# Patient Record
Sex: FEMALE | Race: WHITE | NOT HISPANIC OR LATINO | Employment: OTHER | URBAN - METROPOLITAN AREA
[De-identification: names, ages, dates, MRNs, and addresses within clinical notes are randomized per-mention and may not be internally consistent; named-entity substitution may affect disease eponyms.]

---

## 2018-11-05 DIAGNOSIS — N39.0 RECURRENT UTI: Primary | ICD-10-CM

## 2018-11-06 ENCOUNTER — HOSPITAL ENCOUNTER (OUTPATIENT)
Dept: RADIOLOGY | Facility: HOSPITAL | Age: 83
Discharge: HOME | End: 2018-11-06
Attending: FAMILY MEDICINE
Payer: MEDICARE

## 2018-11-06 DIAGNOSIS — N39.0 RECURRENT UTI: ICD-10-CM

## 2018-11-06 PROCEDURE — 76770 US EXAM ABDO BACK WALL COMP: CPT | Mod: TC

## 2018-11-06 PROCEDURE — 76770 US EXAM ABDO BACK WALL COMP: CPT | Mod: 26,,, | Performed by: RADIOLOGY

## 2018-11-07 ENCOUNTER — APPOINTMENT (OUTPATIENT)
Dept: LAB | Facility: HOSPITAL | Age: 83
End: 2018-11-07
Attending: FAMILY MEDICINE
Payer: MEDICARE

## 2018-11-07 DIAGNOSIS — N39.0 URINARY TRACT INFECTION, SITE NOT SPECIFIED: Primary | ICD-10-CM

## 2018-11-07 LAB
BACTERIA #/AREA URNS HPF: ABNORMAL /HPF
BILIRUB UR QL STRIP: NEGATIVE
CLARITY UR: CLEAR
COLOR UR: YELLOW
GLUCOSE UR QL STRIP: NEGATIVE
HGB UR QL STRIP: ABNORMAL
KETONES UR QL STRIP: ABNORMAL
LEUKOCYTE ESTERASE UR QL STRIP: ABNORMAL
MICROSCOPIC COMMENT: ABNORMAL
NITRITE UR QL STRIP: NEGATIVE
PH UR STRIP: 6 [PH] (ref 5–8)
PROT UR QL STRIP: NEGATIVE
RBC #/AREA URNS HPF: 6 /HPF (ref 0–4)
SP GR UR STRIP: 1.01 (ref 1–1.03)
URN SPEC COLLECT METH UR: ABNORMAL
UROBILINOGEN UR STRIP-ACNC: NEGATIVE EU/DL
WBC #/AREA URNS HPF: 30 /HPF (ref 0–5)

## 2018-11-07 PROCEDURE — 87088 URINE BACTERIA CULTURE: CPT

## 2018-11-07 PROCEDURE — 87077 CULTURE AEROBIC IDENTIFY: CPT

## 2018-11-07 PROCEDURE — 81000 URINALYSIS NONAUTO W/SCOPE: CPT

## 2018-11-07 PROCEDURE — 87186 SC STD MICRODIL/AGAR DIL: CPT

## 2018-11-07 PROCEDURE — 87086 URINE CULTURE/COLONY COUNT: CPT

## 2018-11-10 LAB — BACTERIA UR CULT: NORMAL

## 2018-11-13 ENCOUNTER — TELEPHONE (OUTPATIENT)
Dept: UROLOGY | Facility: CLINIC | Age: 83
End: 2018-11-13

## 2018-11-13 NOTE — TELEPHONE ENCOUNTER
----- Message from Dany Seth sent at 11/13/2018  8:57 AM CST -----  Contact: Candida/Dr. Lau' Office  Candida called in and wanted to see if office received a referral from them that was faxed on the attached patient on 11/8/18?  Candida's call back number is [phone]

## 2018-11-21 ENCOUNTER — OFFICE VISIT (OUTPATIENT)
Dept: UROLOGY | Facility: CLINIC | Age: 83
End: 2018-11-21
Payer: MEDICARE

## 2018-11-21 VITALS
DIASTOLIC BLOOD PRESSURE: 84 MMHG | HEIGHT: 63 IN | WEIGHT: 94 LBS | HEART RATE: 104 BPM | TEMPERATURE: 98 F | SYSTOLIC BLOOD PRESSURE: 120 MMHG | BODY MASS INDEX: 16.66 KG/M2

## 2018-11-21 DIAGNOSIS — N30.00 ACUTE CYSTITIS WITHOUT HEMATURIA: ICD-10-CM

## 2018-11-21 LAB
BILIRUB SERPL-MCNC: NEGATIVE MG/DL
BLOOD URINE, POC: ABNORMAL
COLOR, POC UA: ABNORMAL
GLUCOSE UR QL STRIP: NEGATIVE
KETONES UR QL STRIP: NEGATIVE
LEUKOCYTE ESTERASE URINE, POC: NEGATIVE
NITRITE, POC UA: POSITIVE
PH, POC UA: 6
PROTEIN, POC: NEGATIVE
SPECIFIC GRAVITY, POC UA: 1005
UROBILINOGEN, POC UA: 1

## 2018-11-21 PROCEDURE — 87077 CULTURE AEROBIC IDENTIFY: CPT

## 2018-11-21 PROCEDURE — 87086 URINE CULTURE/COLONY COUNT: CPT

## 2018-11-21 PROCEDURE — 87088 URINE BACTERIA CULTURE: CPT

## 2018-11-21 PROCEDURE — 81002 URINALYSIS NONAUTO W/O SCOPE: CPT | Mod: PBBFAC | Performed by: UROLOGY

## 2018-11-21 PROCEDURE — 87186 SC STD MICRODIL/AGAR DIL: CPT | Mod: 59

## 2018-11-21 PROCEDURE — 99203 OFFICE O/P NEW LOW 30 MIN: CPT | Mod: S$PBB,,, | Performed by: UROLOGY

## 2018-11-21 PROCEDURE — 99999 PR PBB SHADOW E&M-EST. PATIENT-LVL IV: CPT | Mod: PBBFAC,,, | Performed by: UROLOGY

## 2018-11-21 PROCEDURE — 99214 OFFICE O/P EST MOD 30 MIN: CPT | Mod: PBBFAC | Performed by: UROLOGY

## 2018-11-21 RX ORDER — BUSPIRONE HYDROCHLORIDE 5 MG/1
10 TABLET ORAL 3 TIMES DAILY
COMMUNITY
Start: 2018-11-16 | End: 2022-07-01 | Stop reason: SDUPTHER

## 2018-11-21 RX ORDER — TRAZODONE HYDROCHLORIDE 100 MG/1
100 TABLET ORAL NIGHTLY
COMMUNITY
Start: 2018-10-31

## 2018-11-21 RX ORDER — ALBUTEROL SULFATE 90 UG/1
AEROSOL, METERED RESPIRATORY (INHALATION)
COMMUNITY
Start: 2018-11-01 | End: 2022-07-01

## 2018-11-21 RX ORDER — ENTACAPONE 200 MG/1
TABLET ORAL
COMMUNITY
Start: 2018-10-18 | End: 2019-08-09

## 2018-11-21 RX ORDER — CARBIDOPA AND LEVODOPA 25; 250 MG/1; MG/1
1 TABLET ORAL 3 TIMES DAILY
COMMUNITY
Start: 2018-11-16 | End: 2019-09-16 | Stop reason: SDUPTHER

## 2018-11-21 RX ORDER — PROPRANOLOL HYDROCHLORIDE 20 MG/1
TABLET ORAL
COMMUNITY
End: 2019-12-18 | Stop reason: ALTCHOICE

## 2018-11-21 RX ORDER — OMEPRAZOLE 40 MG/1
40 CAPSULE, DELAYED RELEASE ORAL DAILY
COMMUNITY
Start: 2018-10-29 | End: 2022-07-01

## 2018-11-21 RX ORDER — NITROFURANTOIN 25; 75 MG/1; MG/1
100 CAPSULE ORAL 2 TIMES DAILY
Qty: 40 CAPSULE | Refills: 0 | Status: SHIPPED | OUTPATIENT
Start: 2018-11-21 | End: 2018-12-11

## 2018-11-21 NOTE — LETTER
November 21, 2018      Alon Lau MD  [address]  [facility] [place] [address]           [facility]  [address] [place] [address]  Phone: [phone]  Fax: [phone]          Patient: Chiara Nunez   MR Number: [id number]   YOB: 1934   Date of Visit: 11/21/2018       Dear Dr. Alon Lau:    Thank you for referring Chiara Nunez to me for evaluation. Attached you will find relevant portions of my assessment and plan of care.    If you have questions, please do not hesitate to call me. I look forward to following Chiara uNnez along with you.    Sincerely,    Ganesh Mason MD    Enclosure  CC:  No Recipients    If you would like to receive this communication electronically, please contact [email or web address] or [phone] to request more information on [employer] Link access.    For providers and/or their staff who would like to refer a patient to [facility], please contact us through our one-stop-shop provider referral line, [facility], at [phone].    If you feel you have received this communication in error or would no longer like to receive these types of communications, please e-mail [email or web address]

## 2018-11-21 NOTE — PROGRESS NOTES
Subjective:       Patient ID: Chiara Nunez is a 84 y.o. female.    Chief Complaint:   DATE OF VISIT:  11/21/2018.    CHIEF COMPLAINT:  Recurrent urinary tract infections.    Ms. Nunez is an 84-year-old female who referred she is having recurrent  urinary tract infections.  Recent ultrasound shows very minimal mild  right-sided hydronephrosis.  It also shows mild renal atrophy bilaterally.   No evidence of any significant obstruction.  The patient referred that a  urine culture was performed by his family doctor and shows E. coli.  There  were multiple resistances.  In any extent, this E. coli is sensitive to  nitrofurantoin.  The patient is being treated for 5 to 7 days with  nitrofurantoin and the infection is recurring.    On further questioning the patient, the patient's daughter revealed that  the patient does not wipe her rectum properly at the time of her bowel  movement and contaminates the urethra continuously.  I had a lengthy  discussion with her and the daughter regarding the proper way for hygiene  and to prevent urinary tract infections at her age.  They are going to  change.  They are also going to increase the fluid intake and they are  going to increase the Citrus juices intake in order to acidify the urine  and hopefully will be better controlling the urinary tract infections.   After a lengthy discussion we agreed that apart from those measures, she is  going to take Macrobid 100 mg twice a day for two weeks and then daily and  she is going to follow up with me in a month.  If in a month, the patient  is doing well, we are going to decide what type of suppression she is going  to be placed on.  All the questions were answered at her and her daughter's  satisfaction and they left the office in satisfactory condition.        EOR/HN dd: 11/21/2018 16:17:07 (CST)   td: 11/22/2018 00:34:23 (CST)  Doc ID #[id number]   Job ID #[id number]    CC:            Urinary Tract Infection    Pertinent negatives include  no flank pain, frequency, hematuria, urgency or constipation.     Review of Systems   Constitutional: Negative.  Negative for activity change.   HENT: Negative.  Negative for facial swelling.    Eyes: Negative for discharge.   Respiratory: Positive for shortness of breath. Negative for cough.    Cardiovascular: Negative for chest pain and palpitations.   Gastrointestinal: Negative for abdominal distention, blood in stool and constipation.   Genitourinary: Positive for dysuria. Negative for flank pain, frequency, hematuria, urgency and vaginal pain.   Musculoskeletal: Negative.  Negative for arthralgias.   Skin: Negative.    Neurological: Negative.  Negative for dizziness.   Hematological: Negative for adenopathy.   Psychiatric/Behavioral: The patient is not nervous/anxious.        Objective:          Assessment:       1. Acute cystitis without hematuria        Plan:       Acute cystitis without hematuria  -     POCT URINE DIPSTICK WITHOUT MICROSCOPE  -     Urine culture    Other orders  -     nitrofurantoin, macrocrystal-monohydrate, (MACROBID) 100 MG capsule; Take 1 capsule (100 mg total) by mouth 2 (two) times daily. for 40 doses  Dispense: 40 capsule; Refill: 0    RTC 1 mo.

## 2018-11-23 ENCOUNTER — TELEPHONE (OUTPATIENT)
Dept: UROLOGY | Facility: CLINIC | Age: 83
End: 2018-11-23

## 2018-11-23 NOTE — TELEPHONE ENCOUNTER
Contacted [facility] and canceled rx. Phoned in rx at [facility] in [place].    Attempted to call patient's daughter, Fabiola. No answer and unable to leave voicemail.        ----- Message from Natalie Linares sent at 11/23/2018  4:08 PM CST -----  Contact: Pt daughter Fabiola can be reached at [phone]  Fabiola is calling to speak with the nurse concerning a script of antiobotic nitrofurantoin, macrocrystal-monohydrate, (MACROBID) 100 MG capsule  Pharmacy is out of it.      Please send meds to [facility] in [place] [phone]       Thank you!

## 2018-11-24 LAB
BACTERIA UR CULT: NORMAL
BACTERIA UR CULT: NORMAL

## 2019-01-02 ENCOUNTER — OFFICE VISIT (OUTPATIENT)
Dept: UROLOGY | Facility: CLINIC | Age: 84
End: 2019-01-02
Payer: MEDICARE

## 2019-01-02 VITALS
WEIGHT: 96 LBS | HEIGHT: 65 IN | DIASTOLIC BLOOD PRESSURE: 81 MMHG | SYSTOLIC BLOOD PRESSURE: 136 MMHG | TEMPERATURE: 98 F | BODY MASS INDEX: 15.99 KG/M2 | HEART RATE: 84 BPM

## 2019-01-02 DIAGNOSIS — N30.00 ACUTE CYSTITIS WITHOUT HEMATURIA: Primary | ICD-10-CM

## 2019-01-02 LAB
BILIRUB SERPL-MCNC: NEGATIVE MG/DL
BLOOD URINE, POC: NEGATIVE
COLOR, POC UA: NORMAL
GLUCOSE UR QL STRIP: NEGATIVE
KETONES UR QL STRIP: NEGATIVE
LEUKOCYTE ESTERASE URINE, POC: NEGATIVE
NITRITE, POC UA: NEGATIVE
PH, POC UA: 5
PROTEIN, POC: NEGATIVE
SPECIFIC GRAVITY, POC UA: 1015
UROBILINOGEN, POC UA: NEGATIVE

## 2019-01-02 PROCEDURE — 99213 PR OFFICE/OUTPT VISIT, EST, LEVL III, 20-29 MIN: ICD-10-PCS | Mod: S$PBB,,, | Performed by: UROLOGY

## 2019-01-02 PROCEDURE — 99999 PR PBB SHADOW E&M-EST. PATIENT-LVL III: ICD-10-PCS | Mod: PBBFAC,,, | Performed by: UROLOGY

## 2019-01-02 PROCEDURE — 99213 OFFICE O/P EST LOW 20 MIN: CPT | Mod: PBBFAC | Performed by: UROLOGY

## 2019-01-02 PROCEDURE — 99999 PR PBB SHADOW E&M-EST. PATIENT-LVL III: CPT | Mod: PBBFAC,,, | Performed by: UROLOGY

## 2019-01-02 PROCEDURE — 99213 OFFICE O/P EST LOW 20 MIN: CPT | Mod: S$PBB,,, | Performed by: UROLOGY

## 2019-01-02 PROCEDURE — 81002 URINALYSIS NONAUTO W/O SCOPE: CPT | Mod: PBBFAC | Performed by: UROLOGY

## 2019-01-02 RX ORDER — NITROFURANTOIN 25; 75 MG/1; MG/1
100 CAPSULE ORAL 2 TIMES DAILY
Qty: 30 CAPSULE | Refills: 1 | Status: SHIPPED | OUTPATIENT
Start: 2019-01-02 | End: 2019-01-12

## 2019-01-02 NOTE — PROGRESS NOTES
OFFICE NOTE    DATE OF VISIT:  01/02/2019.    CHIEF COMPLAINT:  Chronic urinary tract infection.    HISTORY OF PRESENT ILLNESS:  Ms. Nunez is an 84-year-old female who was  last seen in our office on 11/21/2018.  The patient did have recurrent  urinary tract infections.  The patient was treated at that time with  Macrobid 100 mg p.o. twice a day.  She took that for 20 days and she  referred that she is feeling much better and the urine today is completely  clear.  An ultrasound of the kidney was performed that shows mild renal  atrophy with mild right-sided hydronephrosis and no site of obstruction.    I explained to the patient that the urine is completely clear and this is  encouraging and also encouraged her to keep the hygiene at the time of  bowel movement, well done and only wipe from the front to the back.   Increase fluid intake too.  I also suggested since she has this recurrent  infections that we may want to proceed with a course of suppressive therapy  using Macrobid 100 mg p.o. daily for the next two months and she agreed for  it.  All the questions were answered at her satisfaction and her daughter's  satisfaction and they left the office in satisfactory condition.  I spent  approximately 20 minutes with Ms. Nunez and her daughter and all the time  was spent on counseling.        EOR/IN dd: 01/02/2019 16:55:52 (CST)   td: 01/02/2019 17:19:52 (CST)  Doc ID #[id number]   Job ID #[id number]    CC:

## 2019-01-29 ENCOUNTER — CLINICAL SUPPORT (OUTPATIENT)
Dept: UROLOGY | Facility: CLINIC | Age: 84
End: 2019-01-29
Payer: MEDICARE

## 2019-01-29 ENCOUNTER — TELEPHONE (OUTPATIENT)
Dept: UROLOGY | Facility: CLINIC | Age: 84
End: 2019-01-29

## 2019-01-29 DIAGNOSIS — N30.00 ACUTE CYSTITIS WITHOUT HEMATURIA: Primary | ICD-10-CM

## 2019-01-29 LAB
BILIRUB SERPL-MCNC: NORMAL MG/DL
BLOOD URINE, POC: NORMAL
COLOR, POC UA: NORMAL
GLUCOSE UR QL STRIP: NORMAL
KETONES UR QL STRIP: NORMAL
LEUKOCYTE ESTERASE URINE, POC: NORMAL
NITRITE, POC UA: NORMAL
PH, POC UA: 5
PROTEIN, POC: NORMAL
SPECIFIC GRAVITY, POC UA: 1.01
UROBILINOGEN, POC UA: NORMAL

## 2019-01-29 PROCEDURE — 99999 PR PBB SHADOW E&M-EST. PATIENT-LVL I: ICD-10-PCS | Mod: PBBFAC,,,

## 2019-01-29 PROCEDURE — 99999 PR PBB SHADOW E&M-EST. PATIENT-LVL I: CPT | Mod: PBBFAC,,,

## 2019-01-29 PROCEDURE — 87086 URINE CULTURE/COLONY COUNT: CPT

## 2019-01-29 PROCEDURE — 81002 URINALYSIS NONAUTO W/O SCOPE: CPT | Mod: PBBFAC

## 2019-01-29 PROCEDURE — 99211 OFF/OP EST MAY X REQ PHY/QHP: CPT | Mod: PBBFAC

## 2019-01-29 NOTE — TELEPHONE ENCOUNTER
pts daughter states she has dysuria and a positive leukocyte at home test. Schedule pt nurse appt for urine sample drop off.

## 2019-01-29 NOTE — TELEPHONE ENCOUNTER
----- Message from Natalie Slater sent at 1/29/2019  9:18 AM CST -----  Contact: Fabiola  Type:  Sooner Apoointment Request    Caller is requesting a sooner appointment.  Caller declined first available appointment listed below.  Caller will not accept being placed on the waitlist and is requesting a message be sent to doctor.    Name of Caller:  daughter  When is the first available appointment?  2/20/19  Symptoms:  UTI, positive leukocytes  Best Call Back Number:  [phone] or [phone]  Additional Information:   States has an appointment tomorrow afternoon and asking if can be seen around 11:30 tomorrow. Thanks!

## 2019-01-29 NOTE — TELEPHONE ENCOUNTER
----- Message from Nydia Sanchez sent at 1/29/2019 11:46 AM CST -----  Contact: Fabiola Day (Daughter)  Type: Needs Medical Advice    Who Called:  Fabiola Day (Daughter)  Symptoms (please be specific):  UTI  Best Call Back Number: Fabiola at  or   Additional Information: Calling to speak with the Nurse about the symptoms. They took a test last night and it turned deep purple. She would like to bring the patient in today about 3:30, if possible or tomorrow 11. Please advise.

## 2019-01-29 NOTE — TELEPHONE ENCOUNTER
----- Message from Mary Main sent at 1/29/2019 10:03 AM CST -----  Contact: Daughter Fabiola [phone]  Call placed to pod. Patient's daughter Fabiola called back she just missed a call from the office she was trying to get her mother seen today. Call back at [phone] thank you

## 2019-01-30 ENCOUNTER — TELEPHONE (OUTPATIENT)
Dept: UROLOGY | Facility: CLINIC | Age: 84
End: 2019-01-30

## 2019-01-30 LAB
BACTERIA UR CULT: NORMAL
BACTERIA UR CULT: NORMAL

## 2019-01-30 NOTE — TELEPHONE ENCOUNTER
----- Message from Bhavna Guerra sent at 1/30/2019  1:10 PM CST -----  Type: Needs Medical Advice    Who Called:  Alison Gilesmckayla - daughter  Pharmacy name and phone #:   [id number] - [place], [place] - [address]  [address]  [place] [address]  Phone: [phone] Fax: [phone]  Best Call Back Number: [phone]  Additional Information: contact caller regarding patient's test results from the urine culture yesterday 01/29/19 and if any medication will be phoned into her pharmacy     Thank you

## 2019-01-30 NOTE — TELEPHONE ENCOUNTER
Informed pts daughter that provider wants to wait on the culture results from the lab before he prescribes anything. Pt verbalized understanding.

## 2019-04-17 ENCOUNTER — TELEPHONE (OUTPATIENT)
Dept: UROLOGY | Facility: CLINIC | Age: 84
End: 2019-04-17

## 2019-04-17 DIAGNOSIS — N30.00 ACUTE CYSTITIS WITHOUT HEMATURIA: Primary | ICD-10-CM

## 2019-04-17 NOTE — TELEPHONE ENCOUNTER
----- Message from Sameera Walker sent at 4/17/2019  1:43 PM CDT -----  Contact: daughterFabiola  Type: Needs Medical Advice    Who Called:  DaughterFabiola  Symptoms (please be specific):  UTI  How long has patient had these symptoms:  Week  [facility]  Best Call Back Number: [phone]  Additional Information: patient is acting erratic and showing signs of a UTI. Daughter would like to bring in a urine sample for testing. Please call daughter. Thanks!

## 2019-04-17 NOTE — TELEPHONE ENCOUNTER
Informed pts daughter that I will put in an order for her to bring a sample of the pts urine to the lab. pts daughter verbalized understanding.

## 2019-04-18 ENCOUNTER — LAB VISIT (OUTPATIENT)
Dept: LAB | Facility: HOSPITAL | Age: 84
End: 2019-04-18
Attending: UROLOGY
Payer: MEDICARE

## 2019-04-18 DIAGNOSIS — N30.00 ACUTE CYSTITIS WITHOUT HEMATURIA: ICD-10-CM

## 2019-04-18 LAB
BILIRUB UR QL STRIP: NEGATIVE
CLARITY UR: CLEAR
COLOR UR: YELLOW
GLUCOSE UR QL STRIP: NEGATIVE
HGB UR QL STRIP: NEGATIVE
KETONES UR QL STRIP: ABNORMAL
LEUKOCYTE ESTERASE UR QL STRIP: ABNORMAL
MICROSCOPIC COMMENT: NORMAL
NITRITE UR QL STRIP: NEGATIVE
PH UR STRIP: 6 [PH] (ref 5–8)
PROT UR QL STRIP: NEGATIVE
SP GR UR STRIP: 1.01 (ref 1–1.03)
URN SPEC COLLECT METH UR: ABNORMAL
UROBILINOGEN UR STRIP-ACNC: NEGATIVE EU/DL
WBC #/AREA URNS HPF: 3 /HPF (ref 0–5)

## 2019-04-18 PROCEDURE — 81000 URINALYSIS NONAUTO W/SCOPE: CPT

## 2019-04-18 PROCEDURE — 87086 URINE CULTURE/COLONY COUNT: CPT

## 2019-04-20 LAB
BACTERIA UR CULT: NORMAL
BACTERIA UR CULT: NORMAL

## 2019-04-22 ENCOUNTER — TELEPHONE (OUTPATIENT)
Dept: UROLOGY | Facility: CLINIC | Age: 84
End: 2019-04-22

## 2019-04-22 NOTE — TELEPHONE ENCOUNTER
Informed pts daughter that the results did not show any infection. Pt daughter verbalized understanding and states her symptoms are improving.

## 2019-04-22 NOTE — TELEPHONE ENCOUNTER
----- Message from Mohini Santillan sent at 4/22/2019  3:25 PM CDT -----    Pt  Is calling  For  Test  Results // please call  [phone]   Daughter  nhi

## 2019-07-26 ENCOUNTER — TELEPHONE (OUTPATIENT)
Dept: UROLOGY | Facility: CLINIC | Age: 84
End: 2019-07-26

## 2019-07-26 NOTE — TELEPHONE ENCOUNTER
Attempted to call daughters, Fabiola Day and Chiara Bert regarding message. No answer, no VM for both.    Per Dr. Mason, patient will need to go to ER for further evaluation.      ----- Message from Natalie Denson sent at 7/26/2019 11:34 AM CDT -----  Type: Needs Medical Advice    Who Called:  Daughter - Fabiola Nunez  Symptoms (please be specific):  Possible uti  How long has patient had these symptoms:  Acting confused  Pharmacy name and phone #:    [facility] in [place] in the new pharmacy - daughter does not have phone number  Best Call Back Number: [phone]  Additional Information: would like to  a specimen container and get a urine specimen to check for a urinary tract infection/daughter states this is how was checked before/please advise

## 2019-08-05 ENCOUNTER — TELEPHONE (OUTPATIENT)
Dept: NEUROLOGY | Facility: CLINIC | Age: 84
End: 2019-08-05

## 2019-08-05 NOTE — TELEPHONE ENCOUNTER
Called and spoke to Mrs. Nunez daughter regarding her mother's appt with . I stated that if she can bring her for 8:20 AM

## 2019-08-09 ENCOUNTER — OFFICE VISIT (OUTPATIENT)
Dept: NEUROLOGY | Facility: CLINIC | Age: 84
End: 2019-08-09
Payer: MEDICARE

## 2019-08-09 VITALS
SYSTOLIC BLOOD PRESSURE: 114 MMHG | HEIGHT: 65 IN | DIASTOLIC BLOOD PRESSURE: 70 MMHG | BODY MASS INDEX: 15.98 KG/M2 | HEART RATE: 83 BPM

## 2019-08-09 DIAGNOSIS — E53.8 B12 DEFICIENCY: Primary | ICD-10-CM

## 2019-08-09 DIAGNOSIS — G20.A1 PD (PARKINSON'S DISEASE): ICD-10-CM

## 2019-08-09 DIAGNOSIS — R41.9 COGNITIVE COMPLAINTS: ICD-10-CM

## 2019-08-09 PROBLEM — N30.00 ACUTE CYSTITIS: Status: RESOLVED | Noted: 2018-11-21 | Resolved: 2019-08-09

## 2019-08-09 PROCEDURE — 99204 PR OFFICE/OUTPT VISIT, NEW, LEVL IV, 45-59 MIN: ICD-10-PCS | Mod: S$PBB,,, | Performed by: PSYCHIATRY & NEUROLOGY

## 2019-08-09 PROCEDURE — 99213 OFFICE O/P EST LOW 20 MIN: CPT | Mod: PBBFAC | Performed by: PSYCHIATRY & NEUROLOGY

## 2019-08-09 PROCEDURE — 99204 OFFICE O/P NEW MOD 45 MIN: CPT | Mod: S$PBB,,, | Performed by: PSYCHIATRY & NEUROLOGY

## 2019-08-09 PROCEDURE — 99999 PR PBB SHADOW E&M-EST. PATIENT-LVL III: ICD-10-PCS | Mod: PBBFAC,,, | Performed by: PSYCHIATRY & NEUROLOGY

## 2019-08-09 PROCEDURE — 99999 PR PBB SHADOW E&M-EST. PATIENT-LVL III: CPT | Mod: PBBFAC,,, | Performed by: PSYCHIATRY & NEUROLOGY

## 2019-08-09 RX ORDER — CARBIDOPA AND LEVODOPA 50; 200 MG/1; MG/1
1 TABLET, EXTENDED RELEASE ORAL 3 TIMES DAILY
Qty: 90 TABLET | Refills: 3 | Status: SHIPPED | OUTPATIENT
Start: 2019-08-09 | End: 2019-09-13 | Stop reason: SDUPTHER

## 2019-08-09 RX ORDER — CYANOCOBALAMIN 1000 UG/ML
INJECTION, SOLUTION INTRAMUSCULAR; SUBCUTANEOUS
Qty: 10 ML | Refills: 3 | Status: SHIPPED | OUTPATIENT
Start: 2019-08-09 | End: 2022-07-01

## 2019-08-09 NOTE — ASSESSMENT & PLAN NOTE
Not fully evaluated today, but both indicate some subjective loss.   -> labs today    -> need [place] Cognitive Assessment next visit

## 2019-08-09 NOTE — LETTER
August 9, 2019      Alon Lau MD  [address]  [facility] [place] [address]           [facility]  [address]  [place] [address]  Phone: [phone]  Fax: [phone]          Patient: Chiara Nunez   MR Number: [id number]   YOB: 1934   Date of Visit: 8/9/2019       Dear Dr. Alon Lau:    Thank you for referring Chiara Nunez to me for evaluation. Attached you will find relevant portions of my assessment and plan of care.    If you have questions, please do not hesitate to call me. I look forward to following Chiara Nunez along with you.    Sincerely,    Airam Mccann MD    Enclosure  CC:  No Recipients    If you would like to receive this communication electronically, please contact [email or web address] or [phone] to request more information on [employer] Link access.    For providers and/or their staff who would like to refer a patient to [facility], please contact us through our one-stop-shop provider referral line, [facility], at [phone].    If you feel you have received this communication in error or would no longer like to receive these types of communications, please e-mail [email or web address]

## 2019-08-09 NOTE — PROGRESS NOTES
Chiara SMITH Chief Complaints during this visit:  New Patient visit for  PD   Alon Lau MD  [address]  [place] [address]    Primary Care Physician  Alon Lau MD  [address]  [facility] [place] [address]          History of present illness:   85 y.o.  W self-referred for PD.  Does not care for Dr. River.  Accompanied by daughter, Fabiola, who lives with her.    Main concern is her memory.  Short-term memory is poor.    Second concern had been her sob during night, increase in anxiety medication and prn O2 has helped considerably.  Walking and balance is third concern.  Freezing of gait at times.   Daughter can see if she has missed carbidopa-levodopa as the freezing gets worse.    Tremor started about 8 years ago in mouth.  Moved in with daughter about 3 years ago.          II.  Review of systems:  As in HPI,  otherwise, balance 10 systems reviewed and are negative.    III.  Past Medical History:   Diagnosis Date    Parkinsons     PD (Parkinson's disease) 8/9/2019 2011- tremor in mouth; FOG    Urinary tract infection      History reviewed. No pertinent family history.  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      carbidopa-levodopa  mg (SINEMET)  mg per tablet Take 1 tablet by mouth 3 (three) times daily. 9, 1, 5     omeprazole (PRILOSEC) 40 MG capsule Take 40 mg by mouth once daily.      traZODone (DESYREL) 100 MG tablet Take 100 mg by mouth every evening.      VENTOLIN HFA 90 mcg/actuation inhaler      propranolol (INDERAL) 20 MG tablet propranolol 20 mg tablet     [DISCONTINUED] entacapone (COMTAN) 200 mg Tab Not taking      No current facility-administered medications on file prior to visit.        PRIOR problem-specific medications tried:  comtan did not help and was expensive    Review of  "patient's allergies indicates:   Allergen Reactions    Iron        IV. Physical Exam    Vitals:    08/09/19 0834   BP: 114/70   Pulse: 83   Height: 5' 5" (1.651 m)     General appearance: Well nourished, well developed, no acute distress.    Appears younger than stated age.     Cardiovascular:  pedal pulses 2, no edema or cyanosis, heart regular rate and rhythym, no carotid bruits.         -------------------------------------------------------------  Facial Expression: 1: Slight: Minimal masked facies manifested only by decreased frequency of blinking.      Affect: full       Orientation to time & place:  Oriented to time, place, person and situation       Attention & concentration:  Normal attention span and concentration       Memory:  Not formally tested  Language: Spontaneous, fluent; able to repeat and name objects        Fund of knowledge:  Aware of current events        Speech:  normal (not dysarthric)  -------------------------------------------------------  Cranial nerves: normal visual acuity, visual fields full, optic discs not visualized, pupils equal round and reactive, extraocular movements intact,       facial sensation intact, face symmetrical, hearing intact to whisper, palate raises midline, shoulder shrug strength normal, tongue protrudes midline.        -------------------------------------------------------  Musculoskeletal  Muscle tone: all 4 extremities normal        Muscle Bulk: all 4 extremities normal        Muscle strength:  5/5 in all 4 extremities        No pronator drift  Sensation: Intact to light touch in all extremities            --------------------------------------------------------------  Cerebellar and Coordination  Gait:  2: Mild: Independent walking but with substantial gait impairment.   FOG:  3: Moderate: Freezes once during straight walking.       Finger-nose: no dysmetria       Rapid Alternating Movements (pronation/supination):  R normal; L " normal  --------------------------------------------------------------  Abnormality of movement (bradykinesia, hyperkinesia) present? Yes, 2: Mild: Mild global slowness and poverty of spontaneous movements.     Tremor present?   No   Posture:  2: Mild: Definite flexion, scoliosis or leaning to one side, but patient can correct posture to  normal posture when asked to do so.   Postural stability:  no Rhomberg    V.  Laboratory/ Radiological Data:   None available           VI. Assessment and Plan            Problem List Items Addressed This Visit        1 - High    PD (Parkinson's disease)    Overview     2011- tremor in mouth; FOG         Current Assessment & Plan     Despite age and 8-9 years of Parkinson's Disease, she is ambulatory and high functioning.  Wearing off seems to be the primary physical problem.   -> trial of carbidopa-levodopa 50/200 in place of carbidopa-levodopa 25/250   -> PT/OT         Relevant Medications    carbidopa-levodopa  mg (SINEMET CR)  mg TbSR    Other Relevant Orders    Ambulatory Referral to Physical/Occupational Therapy       2     Cognitive complaints    Current Assessment & Plan     Not fully evaluated today, but both indicate some subjective loss.   -> labs today    -> need [place] Cognitive Assessment next visit         Relevant Orders    Vitamin B1    Vitamin B12    TSH (Completed)    CBC auto differential (Completed)    Comprehensive metabolic panel (Completed)                Follow up in about 3 months (around 11/9/2019) for PD.

## 2019-08-09 NOTE — PATIENT INSTRUCTIONS
1.  Please try Carbidopa-levodopa  INSTEAD of the  for 3 or 4 days.  If it is not working as well as the , simply stop the  and go back to the !        2.  I am doing blood-work today to check for vitamin deficiencies that can cause memory loss.  If your results are normal, then I'll start a memory medication (that can also help with the freezing of the feet when walking).

## 2019-08-09 NOTE — ASSESSMENT & PLAN NOTE
Despite age and 8-9 years of Parkinson's Disease, she is ambulatory and high functioning.  Wearing off seems to be the primary physical problem.   -> trial of carbidopa-levodopa 50/200 in place of carbidopa-levodopa 25/250   -> PT/OT

## 2019-08-12 ENCOUNTER — TELEPHONE (OUTPATIENT)
Dept: NEUROLOGY | Facility: CLINIC | Age: 84
End: 2019-08-12

## 2019-08-12 NOTE — TELEPHONE ENCOUNTER
Attempted to call patient twice re: B12 results. Unable to leave message. Mailbox is full. [facility] is not set up.

## 2019-08-29 ENCOUNTER — TELEPHONE (OUTPATIENT)
Dept: NEUROLOGY | Facility: CLINIC | Age: 84
End: 2019-08-29

## 2019-08-29 NOTE — TELEPHONE ENCOUNTER
Attempted to call pt re: B12 results. Voicemail is full. Unable to leave message for her.    Attempted to call pts daughter x 2. Voicemail is not set up. Unable to leave message.

## 2019-09-13 DIAGNOSIS — G20.A1 PD (PARKINSON'S DISEASE): ICD-10-CM

## 2019-09-13 NOTE — TELEPHONE ENCOUNTER
----- Message from Dara Tabares sent at 9/13/2019 10:57 AM CDT -----  Calling to inform Dr. Mccann that the patient is doing great with medication: carbidopa-levodopa  mg (SINEMET CR)  mg TbSR, but is nearly out. Please call refill.    Rx Refill/Request     Is this a Refill or New Rx:  Refill    Rx Name and Strength: carbidopa-levodopa  mg (SINEMET CR)  mg TbSR     Preferred Pharmacy with phone number:     [facility], [place] - [address]  [address]  [place] [address]  Phone: [phone] Fax: [phone]    Communication Preference: Fabiola (r) @ [phone]  Additional Information:

## 2019-09-16 RX ORDER — CARBIDOPA AND LEVODOPA 25; 250 MG/1; MG/1
1 TABLET ORAL 3 TIMES DAILY
Qty: 270 TABLET | Refills: 4 | Status: SHIPPED | OUTPATIENT
Start: 2019-09-16 | End: 2020-04-20 | Stop reason: SDUPTHER

## 2019-09-16 RX ORDER — CARBIDOPA AND LEVODOPA 50; 200 MG/1; MG/1
1 TABLET, EXTENDED RELEASE ORAL 3 TIMES DAILY
Qty: 90 TABLET | Refills: 3 | Status: SHIPPED | OUTPATIENT
Start: 2019-09-16 | End: 2019-12-18 | Stop reason: ALTCHOICE

## 2019-09-16 RX ORDER — CARBIDOPA AND LEVODOPA 50; 200 MG/1; MG/1
1 TABLET, EXTENDED RELEASE ORAL 3 TIMES DAILY
Qty: 90 TABLET | Refills: 3 | Status: SHIPPED | OUTPATIENT
Start: 2019-09-16 | End: 2019-09-16 | Stop reason: SDUPTHER

## 2019-09-16 NOTE — TELEPHONE ENCOUNTER
----- Message from Ascencion Styles sent at 9/16/2019 12:15 PM CDT -----  Contact: Pt  Pt needs a refill carbidopa-levodopa  mg (SINEMET)  mg per tableton called into pharmacy at [facility] [address] [place] [address], [place] - [address]    Results for B12 pt is getting injections in August and Septmeber by .    Pt can be reached at [phone].    Thank You.

## 2019-09-20 DIAGNOSIS — N30.00 ACUTE CYSTITIS WITHOUT HEMATURIA: Primary | ICD-10-CM

## 2019-10-16 ENCOUNTER — OFFICE VISIT (OUTPATIENT)
Dept: UROLOGY | Facility: CLINIC | Age: 84
End: 2019-10-16
Payer: MEDICARE

## 2019-10-16 ENCOUNTER — HOSPITAL ENCOUNTER (OUTPATIENT)
Dept: RADIOLOGY | Facility: HOSPITAL | Age: 84
Discharge: HOME | End: 2019-10-16
Attending: UROLOGY
Payer: MEDICARE

## 2019-10-16 VITALS
TEMPERATURE: 98 F | SYSTOLIC BLOOD PRESSURE: 136 MMHG | RESPIRATION RATE: 16 BRPM | BODY MASS INDEX: 18.33 KG/M2 | WEIGHT: 99.63 LBS | HEART RATE: 86 BPM | HEIGHT: 62 IN | DIASTOLIC BLOOD PRESSURE: 86 MMHG

## 2019-10-16 DIAGNOSIS — Z87.440 HISTORY OF RECURRENT UTIS: ICD-10-CM

## 2019-10-16 DIAGNOSIS — N30.00 ACUTE CYSTITIS WITHOUT HEMATURIA: ICD-10-CM

## 2019-10-16 LAB
BILIRUB SERPL-MCNC: NEGATIVE MG/DL
BLOOD URINE, POC: NEGATIVE
COLOR, POC UA: NORMAL
GLUCOSE UR QL STRIP: NEGATIVE
KETONES UR QL STRIP: 15
LEUKOCYTE ESTERASE URINE, POC: NORMAL
NITRITE, POC UA: NEGATIVE
PH, POC UA: 5
PROTEIN, POC: NEGATIVE
SPECIFIC GRAVITY, POC UA: 1020
UROBILINOGEN, POC UA: NEGATIVE

## 2019-10-16 PROCEDURE — 81002 URINALYSIS NONAUTO W/O SCOPE: CPT | Mod: PBBFAC | Performed by: UROLOGY

## 2019-10-16 PROCEDURE — 99214 OFFICE O/P EST MOD 30 MIN: CPT | Mod: S$PBB,,, | Performed by: UROLOGY

## 2019-10-16 PROCEDURE — 74018 RADEX ABDOMEN 1 VIEW: CPT | Mod: 26,,, | Performed by: RADIOLOGY

## 2019-10-16 PROCEDURE — 99214 PR OFFICE/OUTPT VISIT, EST, LEVL IV, 30-39 MIN: ICD-10-PCS | Mod: S$PBB,,, | Performed by: UROLOGY

## 2019-10-16 PROCEDURE — 99213 OFFICE O/P EST LOW 20 MIN: CPT | Mod: PBBFAC,25 | Performed by: UROLOGY

## 2019-10-16 PROCEDURE — 74018 XR ABDOMEN AP 1 VIEW: ICD-10-PCS | Mod: 26,,, | Performed by: RADIOLOGY

## 2019-10-16 PROCEDURE — 74018 RADEX ABDOMEN 1 VIEW: CPT | Mod: TC,FY

## 2019-10-16 PROCEDURE — 99999 PR PBB SHADOW E&M-EST. PATIENT-LVL III: ICD-10-PCS | Mod: PBBFAC,,, | Performed by: UROLOGY

## 2019-10-16 PROCEDURE — 99999 PR PBB SHADOW E&M-EST. PATIENT-LVL III: CPT | Mod: PBBFAC,,, | Performed by: UROLOGY

## 2019-10-16 RX ORDER — NITROFURANTOIN 25; 75 MG/1; MG/1
100 CAPSULE ORAL DAILY
Qty: 60 CAPSULE | Refills: 0 | Status: SHIPPED | OUTPATIENT
Start: 2019-10-16 | End: 2019-12-18 | Stop reason: ALTCHOICE

## 2019-10-16 RX ORDER — NITROFURANTOIN 25; 75 MG/1; MG/1
100 CAPSULE ORAL 2 TIMES DAILY
Refills: 0 | COMMUNITY
Start: 2019-10-14 | End: 2019-10-16 | Stop reason: SDUPTHER

## 2019-10-16 RX ORDER — NITROFURANTOIN 25; 75 MG/1; MG/1
100 CAPSULE ORAL DAILY
Qty: 60 CAPSULE | Refills: 0 | Status: SHIPPED | OUTPATIENT
Start: 2019-10-16 | End: 2019-10-16 | Stop reason: SDUPTHER

## 2019-10-16 NOTE — PROGRESS NOTES
Subjective:       Patient ID: Chiara Nunez is a 85 y.o. female.    Chief Complaint:   DATE OF VISIT:  10/16/2019.    CHIEF COMPLAINT:  Chronic recurrent urinary tract infections.    Ms. Nunez is an 85-year-old female who was last seen in our office on  01/2019.  At that time, the patient had a urinary tract infection and was  suggested to take Macrobid once a day seems to be that she or her family  did not follow the recommendation and she has been having recurrent  infections and Dr. Alon Lau prescribed medications for her.  At the  present time, he is taking nitrofurantoin 100 mg p.o. b.i.d.  Today, she  was present with her daughter.  We had a lengthy discussion regarding  hygiene in the area especially after bowel movements.  The patient has one  bowel movement a week and I suggested that after a bowel movement, she  should probably go ahead and use a shower to clean the perianal area and  vagina.  She seems to understand the problem and is going to do that.  I  also suggest that she drinks a large amount of fluids in order to have a  flushing effect on the bladder.    We do not have current culture, but she is taking nitrofurantoin twice a  day today and the urinalysis is all clear except for a trace of leukocytes.   Due to these, I suggested that she finish the current Macrobid 100 mg  twice a day and then he back on the original plan of doing Macrobid 100 mg  daily in the evening after dinner.  She seems to understand that and is  going to do that for the next two months.  In two months, we are going to  follow her and at that point, we are going to make an adjustment to the  treatment or continue with suppression if needed.  All the questions that  the patient has and her daughter has were answered at their satisfaction  and they have left in satisfactory condition.        EOR/HN dd: 10/16/2019 15:58:30 (CDT)   td: 10/16/2019 22:33:01 (CDT)  Doc ID #[id number]   Job ID #[id number]    CC:             HPI  Review of Systems   Constitutional: Negative.  Negative for activity change.   HENT: Negative.  Negative for facial swelling.    Eyes: Negative for discharge.   Respiratory: Negative for cough and shortness of breath.    Cardiovascular: Negative for chest pain and palpitations.   Gastrointestinal: Negative for abdominal distention, blood in stool and constipation.   Genitourinary: Negative for dysuria, flank pain, frequency, hematuria, urgency and vaginal pain.   Musculoskeletal: Positive for arthralgias.   Skin: Negative.    Neurological: Positive for tremors and weakness. Negative for dizziness.   Hematological: Negative for adenopathy.   Psychiatric/Behavioral: Positive for confusion. The patient is not nervous/anxious.        Objective:      Physical Exam   Constitutional: She is oriented to person, place, and time. She appears well-developed.   HENT:   Head: Normocephalic.   Eyes: Pupils are equal, round, and reactive to light.   Neck: Normal range of motion.   Cardiovascular: Normal rate.    Pulmonary/Chest: Effort normal.   Abdominal: Soft. She exhibits no distension and no mass. There is no tenderness.   Musculoskeletal: Normal range of motion.   Neurological: She is alert and oriented to person, place, and time.   Skin: Skin is warm.     Psychiatric: She has a normal mood and affect.       Assessment:       1. History of recurrent UTIs        Plan:       History of recurrent UTIs    Other orders  -     Discontinue: nitrofurantoin, macrocrystal-monohydrate, (MACROBID) 100 MG capsule; Take 1 capsule (100 mg total) by mouth once daily.  Dispense: 60 capsule; Refill: 0  -     nitrofurantoin, macrocrystal-monohydrate, (MACROBID) 100 MG capsule; Take 1 capsule (100 mg total) by mouth once daily.  Dispense: 60 capsule; Refill: 0    RTC in 2 mo.

## 2019-11-11 ENCOUNTER — OFFICE VISIT (OUTPATIENT)
Dept: NEUROLOGY | Facility: CLINIC | Age: 84
End: 2019-11-11
Payer: MEDICARE

## 2019-11-11 VITALS
RESPIRATION RATE: 14 BRPM | BODY MASS INDEX: 18.41 KG/M2 | SYSTOLIC BLOOD PRESSURE: 129 MMHG | DIASTOLIC BLOOD PRESSURE: 87 MMHG | HEART RATE: 77 BPM | HEIGHT: 62 IN | WEIGHT: 100.06 LBS

## 2019-11-11 DIAGNOSIS — G20.A1 PD (PARKINSON'S DISEASE): Primary | ICD-10-CM

## 2019-11-11 DIAGNOSIS — F02.80 DEMENTIA DUE TO PARKINSON'S DISEASE WITHOUT BEHAVIORAL DISTURBANCE: ICD-10-CM

## 2019-11-11 DIAGNOSIS — E53.8 B12 DEFICIENCY: ICD-10-CM

## 2019-11-11 DIAGNOSIS — H91.90 HEARING LOSS, UNSPECIFIED HEARING LOSS TYPE, UNSPECIFIED LATERALITY: ICD-10-CM

## 2019-11-11 DIAGNOSIS — G20.A1 DEMENTIA DUE TO PARKINSON'S DISEASE WITHOUT BEHAVIORAL DISTURBANCE: ICD-10-CM

## 2019-11-11 PROCEDURE — 99215 OFFICE O/P EST HI 40 MIN: CPT | Mod: S$PBB,,, | Performed by: NURSE PRACTITIONER

## 2019-11-11 PROCEDURE — 99214 OFFICE O/P EST MOD 30 MIN: CPT | Mod: PBBFAC,PN | Performed by: NURSE PRACTITIONER

## 2019-11-11 PROCEDURE — 99999 PR PBB SHADOW E&M-EST. PATIENT-LVL IV: ICD-10-PCS | Mod: PBBFAC,,, | Performed by: NURSE PRACTITIONER

## 2019-11-11 PROCEDURE — 99999 PR PBB SHADOW E&M-EST. PATIENT-LVL IV: CPT | Mod: PBBFAC,,, | Performed by: NURSE PRACTITIONER

## 2019-11-11 PROCEDURE — 99215 PR OFFICE/OUTPT VISIT, EST, LEVL V, 40-54 MIN: ICD-10-PCS | Mod: S$PBB,,, | Performed by: NURSE PRACTITIONER

## 2019-11-11 NOTE — PATIENT INSTRUCTIONS
The memory medicine options to consider are as follows...  Donepezil (Aricept)  Rivastigmine (Exelon)  Memantine (Namenda)    We need to be conservative with dosing because of your weight.   Let us know if you would like to begin one of these medications.    I do recommend that you continue B12 injections.    I do recommend you have your hearing evaluated. Hearing loss is associated with memory loss.

## 2019-11-11 NOTE — PROGRESS NOTES
"Name: Chiara Nunez  MRN: [id number]   CSN: [id number]      Date: 11-11-19      Referring physician:  Airam Mccann MD  [address] [place] [address]    Subjective:      Chief Complaint: Parkinson's disease and memory     History of Present Illness (HPI):    Chiara Nunez is a 85 y.o. right-handed female who presents today for a follow-up evaluation (new to me) of Parkinson's disease (diagnosed 8-9 years ago) and memory loss and is accompanied by daughter, Camryn. She was initially seen by Dr. Mccann 8-9-19. At that time, wearing off was an issue so she was started on carbidopa/levodopa 50/200 in place of 25/250. Pt biggest worry at that time was her memory. Labs were obtained and she is here today for MoCA.  She was discovered to have low B12 and did start B12. She has had total of 3 injections (gets at PCP office). She will be having 4th injection is this week. She has not noted difference with the injections at this point. Neither of them feel her memory is better, in fact daughter feels it is declining.     The change of carbidopa/levodopa did help per dtr. Ms. Ortiz says it is hard to tell.  Takes doses around 9-10 AM and again around 1P and 6PM.   She does have anxiety. Worrier. She says she was not like this before Parkinson's disease but is now.     Does not feel she has a lot of tremor. She does notice it daily.   +stiffness  She does not feel she is physically slow but she feels slow mentally.   Balance is "so-so." She has had one fall about 2 weeks ago- tripped over wheel of walker. No injuries, did not hit head.     Lives with her dtr, Camryn. She is with her all the time.   She cooks very selfdom. Camryn does 99% of cooking and food prep. Camryn says the last thing she tried to make was tea and she had forgotten to turn off stove.   Other dtr, Chiara, takes care of finances. She has been doing this for a year. She says she was not having any trouble with this but Chiara was worried about someone " "trying to take her money.     5 years ago and he managed finances. She lived independently for about 2 years. Then her friends called daughters and told them she was having difficulty driving, walking. They were noting "bonks in the car."   Takes a long time to get dressed (physically). She picks out own clothes.   No issue with feeding.   No issue with bathing. Camryn reminds her to take a bath.     She has nightmares.         Review of Systems    Past Medical History: The patient  has a past medical history of Parkinsons, PD (Parkinson's disease) (2019), and Urinary tract infection.    Social History: The patient  reports that she has never smoked. She has never used smokeless tobacco. She reports that she drinks about 1.0 standard drinks of alcohol per week. She reports that she does not use drugs.    Family History: Their family history is not on file.    Allergies: Iron     Meds:   Current Outpatient Medications on File Prior to Visit   Medication Sig Dispense Refill    busPIRone (BUSPAR) 5 MG Tab Take 10 mg by mouth 3 (three) times daily.       carbidopa-levodopa  mg (SINEMET)  mg per tablet Take 1 tablet by mouth 3 (three) times daily. 270 tablet 4    cyanocobalamin 1,000 mcg/mL injection Inject 1ml daily for a week, then 1ml every month. 10 mL 3    nitrofurantoin, macrocrystal-monohydrate, (MACROBID) 100 MG capsule Take 1 capsule (100 mg total) by mouth once daily. 60 capsule 0    OXYGEN-AIR DELIVERY SYSTEMS MISC 2 L by Misc.(Non-Drug; Combo Route) route as needed.      traZODone (DESYREL) 100 MG tablet Take 100 mg by mouth every evening.       VENTOLIN HFA 90 mcg/actuation inhaler       carbidopa-levodopa  mg (SINEMET CR)  mg TbSR Take 1 tablet by mouth 3 (three) times daily. (Patient not taking: Reported on 10/16/2019) 90 tablet 3    omeprazole (PRILOSEC) 40 MG capsule Take 40 mg by mouth once daily.       propranolol (INDERAL) 20 MG tablet propranolol 20 mg " "tablet       No current facility-administered medications on file prior to visit.        Objective:     Physical Exam:    Vitals:    11/11/19 1024   BP: 129/87   Pulse: 77   Resp: 14   Weight: 45.4 kg (100 lb 1.4 oz)   Height: 5' 2" (1.575 m)     Body mass index is 18.31 kg/m².    Constitutional  Thin, well developed, appears stated age     ..  Neurological    * Mental status  MOCA = 16/30   - Orientation Oriented to: person, place, time, situation, day of week, month of year, year, city and date     - Memory     Impaired   Immediate- 4/5 and 5/5  Delayed-1/5 without cue  With category, recalls 2   With multiple choice, recalls the remaining 2     - Attention/concentration  Decreased     - Language  slow but spontaenous     - Executive  Impaired         Awake, alert, pleasant and cooperative.   RR equal and unlabored.   Face -mild masked.   [place].   Note is made of intermittent rest tremor BH (R>L).  Near moderate global bradykinesia.       Laboratory Results:  Office Visit on 10/16/2019   Component Date Value Ref Range Status    Color, UA 10/16/2019 heidi/ clear   Final    Spec Grav UA 10/16/2019 1,020   Final    pH, UA 10/16/2019 5   Final    WBC, UA 10/16/2019 trace   Final    Nitrite, UA 10/16/2019 negative   Final    Protein 10/16/2019 negative   Final    Glucose, UA 10/16/2019 negative   Final    Ketones, UA 10/16/2019 15   Final    Urobilinogen, UA 10/16/2019 negative   Final    Bilirubin 10/16/2019 negative   Final    Blood, UA 10/16/2019 negative   Final       Imaging:   Independent review of images: NA    Assessment and Plan     PD (Parkinson's disease)    Dementia due to Parkinson's disease without behavioral disturbance  Comments:  MoCA today 16/30    Hearing loss, unspecified hearing loss type, unspecified laterality    B12 deficiency  Comments:  Currently getting injections monthly        Medical Decision Making:    Discussed B12 deficiency and the role this plays in memory, tremor, balance, " and energy. She has had 3 injections total (monthly).     [place]- has had 3 sets of aids and has lost them all. She is seeing ENT tomorrow- considering another pair. Hearing loss is related to memory loss. There were definitely times during the MoCA that it seemed she did not hear what was being asked.     Given her MoCA today and the level of support her daughter provides, her findings are consistent with Parkinson's dementia. If they would like further cognitive evaluation, we can certainly refer for Neuropsychological evaluation.     Discussed memory medication options as well as goals of therapy. She is 100 lbs. Would have to use caution with conservative approach if use donepezil or rivastigmine. Discussed potential SE with these. She is anxious about the potential GI disturbance or even the possibility of nightmares (with donepezil). Discussed potential SE of memantine. She would like to consider these options more and discuss with her daughters. They will let us know.    Continue carbidopa/levodopa without change.     Follow up with Dr. Mccann in 3-4 months in [place].     I spent 60 minutes face-to-face with the patient with >50% of the time spent with counseling and education regarding:  - results of data, diagnosis, and recommendations stated above  - risks and benefits of memory medications  -rationale for B12 injections    Janett Davis, ALONSO, NP-C  Division of Movement and Memory Disorders  [facility]  [phone]

## 2019-11-11 NOTE — LETTER
November 11, 2019      Airam Mccann MD  [address]  [facility] [address]           [facility]  [address] [place] [address]  Phone: [phone]  Fax: [phone]          Patient: Chiara Nunez   MR Number: [id number]   YOB: 1934   Date of Visit: 11/11/2019       Dear Dr. Airam Mccann:    Thank you for referring Chiara Nunez to me for evaluation. Attached you will find relevant portions of my assessment and plan of care.    If you have questions, please do not hesitate to call me. I look forward to following Chiara Nunez along with you.    Sincerely,    Janett Davis, ISAAC    Enclosure  CC:  No Recipients    If you would like to receive this communication electronically, please contact [email or web address] or [phone] to request more information on [email or web address] Link access.    For providers and/or their staff who would like to refer a patient to [facility], please contact us through our one-stop-shop provider referral line, [facility] , at [phone].    If you feel you have received this communication in error or would no longer like to receive these types of communications, please e-mail [email or web address]

## 2019-12-18 ENCOUNTER — OFFICE VISIT (OUTPATIENT)
Dept: UROLOGY | Facility: CLINIC | Age: 84
End: 2019-12-18
Payer: MEDICARE

## 2019-12-18 VITALS
RESPIRATION RATE: 16 BRPM | TEMPERATURE: 98 F | DIASTOLIC BLOOD PRESSURE: 78 MMHG | WEIGHT: 100 LBS | SYSTOLIC BLOOD PRESSURE: 148 MMHG | HEART RATE: 106 BPM | BODY MASS INDEX: 18.4 KG/M2 | HEIGHT: 62 IN

## 2019-12-18 DIAGNOSIS — Z87.440 HISTORY OF RECURRENT UTIS: Primary | ICD-10-CM

## 2019-12-18 DIAGNOSIS — N30.00 ACUTE CYSTITIS WITHOUT HEMATURIA: ICD-10-CM

## 2019-12-18 LAB
BILIRUB SERPL-MCNC: NEGATIVE MG/DL
BLOOD URINE, POC: NEGATIVE
COLOR, POC UA: ABNORMAL
GLUCOSE UR QL STRIP: NEGATIVE
KETONES UR QL STRIP: NEGATIVE
LEUKOCYTE ESTERASE URINE, POC: ABNORMAL
NITRITE, POC UA: NEGATIVE
PH, POC UA: 5
PROTEIN, POC: NEGATIVE
SPECIFIC GRAVITY, POC UA: 1.02
UROBILINOGEN, POC UA: NEGATIVE

## 2019-12-18 PROCEDURE — 81002 URINALYSIS NONAUTO W/O SCOPE: CPT | Mod: PBBFAC | Performed by: UROLOGY

## 2019-12-18 PROCEDURE — 1159F MED LIST DOCD IN RCRD: CPT | Mod: ,,, | Performed by: UROLOGY

## 2019-12-18 PROCEDURE — 1126F AMNT PAIN NOTED NONE PRSNT: CPT | Mod: ,,, | Performed by: UROLOGY

## 2019-12-18 PROCEDURE — 99213 OFFICE O/P EST LOW 20 MIN: CPT | Mod: PBBFAC | Performed by: UROLOGY

## 2019-12-18 PROCEDURE — 99999 PR PBB SHADOW E&M-EST. PATIENT-LVL III: ICD-10-PCS | Mod: PBBFAC,,, | Performed by: UROLOGY

## 2019-12-18 PROCEDURE — 1126F PR PAIN SEVERITY QUANTIFIED, NO PAIN PRESENT: ICD-10-PCS | Mod: ,,, | Performed by: UROLOGY

## 2019-12-18 PROCEDURE — 99212 OFFICE O/P EST SF 10 MIN: CPT | Mod: S$PBB,,, | Performed by: UROLOGY

## 2019-12-18 PROCEDURE — 99999 PR PBB SHADOW E&M-EST. PATIENT-LVL III: CPT | Mod: PBBFAC,,, | Performed by: UROLOGY

## 2019-12-18 PROCEDURE — 87086 URINE CULTURE/COLONY COUNT: CPT

## 2019-12-18 PROCEDURE — 99212 PR OFFICE/OUTPT VISIT, EST, LEVL II, 10-19 MIN: ICD-10-PCS | Mod: S$PBB,,, | Performed by: UROLOGY

## 2019-12-18 PROCEDURE — 1159F PR MEDICATION LIST DOCUMENTED IN MEDICAL RECORD: ICD-10-PCS | Mod: ,,, | Performed by: UROLOGY

## 2019-12-19 NOTE — PROGRESS NOTES
Mrs. Nunez he is a 85-year-old female who have history of chronic recurrent cystitis.  She underwent a course of suppressive therapy using micro with 100 mg daily.  She discontinue the medication 3 weeks ago and she is feeling fine.  She feels no evidence of urinary tract infection symptoms but today's urinalysis shows 1+ leukocytes and positive nitrates.  I suggested to the patient that we probably should proceed with the urine culture before deciding if she will need or not any further suppressive therapy and what agent we need to use for suppression.  She agree for that.  I went ahead and ordered a culture and sensitivity today she is going to be cold with the final disposition.    He is to be noted that she did not have any evidence of urinary tract infections since October 2019 while on suppression.    All the questions were answered of her and her daughter satisfaction there is going to be inspected in the call from us regarding a was patient to use for suppression.    Ganesh Mason

## 2019-12-20 LAB
BACTERIA UR CULT: NORMAL
BACTERIA UR CULT: NORMAL

## 2020-01-22 ENCOUNTER — TELEPHONE (OUTPATIENT)
Dept: NEUROLOGY | Facility: CLINIC | Age: 85
End: 2020-01-22

## 2020-01-22 DIAGNOSIS — G20.A1 DEMENTIA DUE TO PARKINSON'S DISEASE WITHOUT BEHAVIORAL DISTURBANCE: Primary | ICD-10-CM

## 2020-01-22 DIAGNOSIS — F02.80 DEMENTIA DUE TO PARKINSON'S DISEASE WITHOUT BEHAVIORAL DISTURBANCE: Primary | ICD-10-CM

## 2020-01-22 RX ORDER — MEMANTINE HYDROCHLORIDE 5 MG/1
TABLET ORAL
Qty: 60 TABLET | Refills: 11 | Status: SHIPPED | OUTPATIENT
Start: 2020-01-22 | End: 2021-02-08 | Stop reason: SDUPTHER

## 2020-01-22 NOTE — TELEPHONE ENCOUNTER
Spoke to patients daughter and advised of new Namenda rx and directions for use per CK. Verbalizes understanding.

## 2020-01-22 NOTE — TELEPHONE ENCOUNTER
----- Message from Janett Davis NP sent at 1/22/2020  4:19 PM CST -----  Contact: Pt`s daughter - Fabiola  Please let dtr know that I sent Rx for memantine (Namenda). Low dose. Less likely to cause GI issues than Aricept or Exeleon.   ----- Message -----  From: Daxa Arias MA  Sent: 1/22/2020   2:57 PM CST  To: Janett Davis NP    Pt daughter calling for medication discuss at last visit    ----- Message -----  From: Susan Mckay  Sent: 1/22/2020   2:25 PM CST  To: Susan PEDERSEN Staff    Pt`s daughter Fabiola called in to see if the memory medication could now be called in , which was discussed at her last appt. The patient wanted to think about it and her memory seems to be getting worse and said she would now like to take the medicine.    [facility]    Telephone Fax  [phone]  Pharmacy Address and Hours     Address Hours  [address]  [place] [address]    Pt`s daughter Fabiola can be reached at [phone]    Thank you

## 2020-04-19 ENCOUNTER — NURSE TRIAGE (OUTPATIENT)
Dept: ADMINISTRATIVE | Facility: CLINIC | Age: 85
End: 2020-04-19

## 2020-04-19 NOTE — TELEPHONE ENCOUNTER
"  Reason for Disposition   Caller has NON-URGENT medication question about med that PCP prescribed and triager unable to answer question    Additional Information   Negative: Drug overdose and nurse unable to answer question   Negative: Caller requesting information not related to medicine   Negative: Caller requesting a prescription for Strep throat and has a positive culture result   Negative: Rash while taking a medication or within 3 days of stopping it   Negative: Immunization reaction suspected   Negative: [1] Asthma AND [2] having symptoms of asthma (cough, wheezing, etc)   Negative: MORE THAN A DOUBLE DOSE of a prescription or over-the-counter (OTC) drug   Negative: [1] DOUBLE DOSE (an extra dose or lesser amount) of over-the-counter (OTC) drug AND [2] any symptoms (e.g., dizziness, nausea, pain, sleepiness)   Negative: [1] DOUBLE DOSE (an extra dose or lesser amount) of prescription drug AND [2] any symptoms (e.g., dizziness, nausea, pain, sleepiness)   Negative: Took another person's prescription drug   Negative: [1] DOUBLE DOSE (an extra dose or lesser amount) of prescription drug AND [2] NO symptoms (Exception: a double dose of antibiotics)   Negative: Diabetes drug error or overdose (e.g., insulin or extra dose)   Negative: [1] Request for URGENT new prescription or refill of "essential" medication (i.e., likelihood of harm to patient if not taken) AND [2] triager unable to fill per unit policy   Negative: [1] Prescription not at pharmacy AND [2] was prescribed today by PCP   Negative: Pharmacy calling with prescription questions and triager unable to answer question   Negative: Caller has urgent medication question about med that PCP prescribed and triager unable to answer question    Protocols used: MEDICATION QUESTION CALL-A-  Daughter called re carbidopa   Shane Mason [phone]. Urgent message sent to refill.   "

## 2020-04-20 RX ORDER — CARBIDOPA AND LEVODOPA 25; 250 MG/1; MG/1
1 TABLET ORAL 3 TIMES DAILY
Qty: 270 TABLET | Refills: 1 | Status: SHIPPED | OUTPATIENT
Start: 2020-04-20 | End: 2020-04-29 | Stop reason: DRUGHIGH

## 2020-04-20 RX ORDER — CARBIDOPA AND LEVODOPA 25; 250 MG/1; MG/1
1 TABLET ORAL 3 TIMES DAILY
Qty: 270 TABLET | Refills: 4 | Status: CANCELLED | OUTPATIENT
Start: 2020-04-20

## 2020-04-29 ENCOUNTER — TELEPHONE (OUTPATIENT)
Dept: NEUROLOGY | Facility: CLINIC | Age: 85
End: 2020-04-29

## 2020-04-29 NOTE — TELEPHONE ENCOUNTER
Confirmed with pts daughter Fabiola that she cont on cd/ld 50/200 mg CR 1 PO TID.     90 day Rx called into [facility] below.

## 2020-04-29 NOTE — TELEPHONE ENCOUNTER
----- Message from Suzanne Trejo sent at 4/29/2020 11:27 AM CDT -----  Contact: PTs Daughter - Fabiola  Called regarding medication: carbidopa-levodopa  mg (SINEMET)  mg per tablet    Needs to be the time released one - stating the wrong one was prescribed again - needs to be the newer one which is a higher mg  // needs the newest one ordered please    [id number] - [place], [place] - [address]  [address] [place] [address]  Phone: [phone] Fax: [phone]    Daughter callback: [phone]

## 2020-07-28 DIAGNOSIS — G20.A1 PD (PARKINSON'S DISEASE): Primary | ICD-10-CM

## 2020-07-28 RX ORDER — CARBIDOPA AND LEVODOPA 50; 200 MG/1; MG/1
1 TABLET, EXTENDED RELEASE ORAL 2 TIMES DAILY
Qty: 60 TABLET | Refills: 11 | Status: CANCELLED | OUTPATIENT
Start: 2020-07-28 | End: 2021-07-28

## 2020-07-28 NOTE — TELEPHONE ENCOUNTER
----- Message from Denise Alfredo sent at 7/28/2020  8:47 AM CDT -----  Regarding: Rx Refill Request  Rx Refill Request:    Pt's Daughter Chiara called to Request an Rx Refill for the pt for Carb-levo Er 50 200mg acc 90 days would like the Rx sent to the pt's local Pharmacy [id number] - [place], [place] - [address]    Ms Guadarrama would like a call back to confirm that the request has been sent and can be reached at [phone]

## 2020-07-29 RX ORDER — CARBIDOPA AND LEVODOPA 50; 200 MG/1; MG/1
1 TABLET, EXTENDED RELEASE ORAL 3 TIMES DAILY
Qty: 270 TABLET | Refills: 1 | OUTPATIENT
Start: 2020-07-29 | End: 2021-07-29

## 2020-11-05 RX ORDER — CARBIDOPA AND LEVODOPA 50; 200 MG/1; MG/1
1 TABLET, EXTENDED RELEASE ORAL 3 TIMES DAILY
Qty: 270 TABLET | Refills: 0 | Status: SHIPPED | OUTPATIENT
Start: 2020-11-05 | End: 2021-02-08 | Stop reason: SDUPTHER

## 2021-01-29 ENCOUNTER — TELEPHONE (OUTPATIENT)
Dept: NEUROLOGY | Facility: CLINIC | Age: 86
End: 2021-01-29

## 2021-02-08 ENCOUNTER — NURSE TRIAGE (OUTPATIENT)
Dept: ADMINISTRATIVE | Facility: CLINIC | Age: 86
End: 2021-02-08

## 2021-02-08 DIAGNOSIS — F02.80 DEMENTIA DUE TO PARKINSON'S DISEASE WITHOUT BEHAVIORAL DISTURBANCE: ICD-10-CM

## 2021-02-08 DIAGNOSIS — G20.A1 DEMENTIA DUE TO PARKINSON'S DISEASE WITHOUT BEHAVIORAL DISTURBANCE: ICD-10-CM

## 2021-02-08 RX ORDER — MEMANTINE HYDROCHLORIDE 5 MG/1
5 TABLET ORAL 2 TIMES DAILY
Qty: 120 TABLET | Refills: 1 | Status: SHIPPED | OUTPATIENT
Start: 2021-02-08 | End: 2022-07-01 | Stop reason: SDUPTHER

## 2021-02-08 RX ORDER — CARBIDOPA AND LEVODOPA 50; 200 MG/1; MG/1
1 TABLET, EXTENDED RELEASE ORAL 3 TIMES DAILY
Qty: 270 TABLET | Refills: 1 | Status: SHIPPED | OUTPATIENT
Start: 2021-02-08

## 2021-11-04 ENCOUNTER — TELEPHONE (OUTPATIENT)
Dept: PODIATRY | Facility: CLINIC | Age: 86
End: 2021-11-04

## 2022-06-28 ENCOUNTER — OFFICE VISIT (OUTPATIENT)
Dept: PODIATRY | Facility: CLINIC | Age: 87
End: 2022-06-28
Payer: MEDICARE

## 2022-06-28 VITALS
BODY MASS INDEX: 18.4 KG/M2 | SYSTOLIC BLOOD PRESSURE: 105 MMHG | WEIGHT: 100 LBS | DIASTOLIC BLOOD PRESSURE: 65 MMHG | HEIGHT: 62 IN | RESPIRATION RATE: 16 BRPM | HEART RATE: 75 BPM

## 2022-06-28 DIAGNOSIS — L60.1 ONYCHOLYSIS OF TOENAIL: Primary | ICD-10-CM

## 2022-06-28 DIAGNOSIS — B35.1 ONYCHOMYCOSIS OF TOENAIL: ICD-10-CM

## 2022-06-28 PROCEDURE — 99999 PR PBB SHADOW E&M-EST. PATIENT-LVL III: ICD-10-PCS | Mod: PBBFAC,,, | Performed by: PODIATRIST

## 2022-06-28 PROCEDURE — 99999 PR PBB SHADOW E&M-EST. PATIENT-LVL III: CPT | Mod: PBBFAC,,, | Performed by: PODIATRIST

## 2022-06-28 PROCEDURE — 99202 OFFICE O/P NEW SF 15 MIN: CPT | Mod: S$GLB,,, | Performed by: PODIATRIST

## 2022-06-28 PROCEDURE — 99202 PR OFFICE/OUTPT VISIT, NEW, LEVL II, 15-29 MIN: ICD-10-PCS | Mod: S$GLB,,, | Performed by: PODIATRIST

## 2022-06-28 RX ORDER — DOCUSATE SODIUM 100 MG/1
100 CAPSULE, LIQUID FILLED ORAL DAILY
COMMUNITY
Start: 2022-06-09

## 2022-06-28 RX ORDER — ENTACAPONE 200 MG/1
100 TABLET ORAL 2 TIMES DAILY
COMMUNITY
Start: 2022-06-09

## 2022-06-28 RX ORDER — MEMANTINE HYDROCHLORIDE 10 MG/1
10 TABLET ORAL 2 TIMES DAILY
COMMUNITY
Start: 2022-06-09

## 2022-06-28 RX ORDER — NICOTINE POLACRILEX 4 MG
1 LOZENGE BUCCAL DAILY PRN
COMMUNITY
Start: 2022-06-09

## 2022-06-28 RX ORDER — BUSPIRONE HYDROCHLORIDE 10 MG/1
10 TABLET ORAL 3 TIMES DAILY
COMMUNITY
Start: 2022-06-09

## 2022-07-02 NOTE — PROGRESS NOTES
Subjective:       Patient ID: Chiara Nunez is a 88 y.o. female.    Chief Complaint: Nail Problem  Patient with history dementia, Parkinson's, presents with her daughter for evaluation feet, problem with nails.  Patient is nonverbal.  Patient lives with her daughter, is under the care of [facility].  Recently since daughter was out of town patient stayed at [facility]  Daughter relates difficulty caring for feet.  Patient has not indicated pain in feet.    Past Medical History:   Diagnosis Date    Dementia     Parkinsons     PD (Parkinson's disease) 8/9/2019 2011- tremor in mouth; FOG    Urinary tract infection      Past Surgical History:   Procedure Laterality Date    JOINT REPLACEMENT      TOTAL KNEE ARTHROPLASTY       Family History   Problem Relation Age of Onset    Cancer Maternal Grandmother      Social History     Socioeconomic History    Marital status:    Tobacco Use    Smoking status: Never Smoker    Smokeless tobacco: Never Used   Substance and Sexual Activity    Alcohol use: Yes     Alcohol/week: 1.0 standard drink     Types: 1 Glasses of wine per week    Drug use: No       Current Outpatient Medications   Medication Sig Dispense Refill    busPIRone (BUSPAR) 10 MG tablet Take 10 mg by mouth 3 (three) times daily.      carbidopa-levodopa  mg (SINEMET CR)  mg TbSR Take 1 tablet by mouth 3 (three) times daily. 270 tablet 1    docusate sodium (COLACE) 100 MG capsule Take 100 mg by mouth once daily.      entacapone (COMTAN) 200 mg Tab Take 100 mg by mouth 2 (two) times daily.      memantine (NAMENDA) 10 MG Tab Take 10 mg by mouth 2 (two) times daily.      STOOL SOFTENER-LAXATIVE 8.6-50 mg per tablet Take 1 tablet by mouth daily as needed.      traZODone (DESYREL) 100 MG tablet Take 100 mg by mouth every evening.        No current facility-administered medications for this visit.     Review of patient's allergies indicates:   Allergen Reactions    Iron   "      Review of Systems   Constitutional:        Thin, non verbal, eyes closed, responds to touching of feet   HENT: Negative for congestion.    Respiratory: Negative for cough and shortness of breath.    Cardiovascular: Negative for leg swelling.   Musculoskeletal: Positive for gait problem.        Wheelchair   All other systems reviewed and are negative.      Objective:      Vitals:    06/28/22 1447   BP: 105/65   Pulse: 75   Resp: 16   Weight: 45.4 kg (100 lb)   Height: 5' 2" (1.575 m)     Physical Exam  Vitals and nursing note reviewed. Exam conducted with a chaperone present.   Constitutional:       General: She is not in acute distress.  Cardiovascular:      Pulses:           Dorsalis pedis pulses are 1+ on the right side and 1+ on the left side.        Posterior tibial pulses are 1+ on the right side and 1+ on the left side.   Pulmonary:      Effort: Pulmonary effort is normal.   Musculoskeletal:      Right foot: No deformity.      Left foot: No deformity.   Feet:      Right foot:      Skin integrity: Dry skin (mildly dry skin) present.      Toenail Condition: Right toenails are long. Fungal disease present.     Left foot:      Skin integrity: Dry skin (Various degrees of fungal involvement mainly hallux with some onycholysis, distal thickening, no erythema) present.      Toenail Condition: Left toenails are long. Fungal disease present.  Skin:     Capillary Refill: Capillary refill takes 2 to 3 seconds.   Neurological:      General: No focal deficit present.                           Assessment:       1. Onycholysis of toenail    2. Onychomycosis of toenail        Plan:           Reviewed care and maintenance of fungal nails, mild dry skin, the need to check feet and skin lower legs, potential complications  Nails debrided in thickness reduced.  Reviewed reducing thickness weekly, soaking warm water and Epson salt, hydration of skin  Patient/family were in understanding and agreement with treatment plan.  I " counseled the patient on their conditions, implications and medical management.  Instructed patient/family to contact the office with any changes, questions, concerns, worsening of symptoms.   Total face to face time, exam, assessment, treatment, discussion, documentation 20 minutes, more than half this time spent on consultation and coordination of care.   Follow up as needed    This note was created using [employer] voice recognition software that occasionally misinterpreted phrases or words.

## 2022-11-08 ENCOUNTER — TELEPHONE (OUTPATIENT)
Dept: PODIATRY | Facility: CLINIC | Age: 87
End: 2022-11-08
Payer: MEDICARE

## 2022-11-10 ENCOUNTER — OFFICE VISIT (OUTPATIENT)
Dept: PODIATRY | Facility: CLINIC | Age: 87
End: 2022-11-10
Payer: MEDICARE

## 2022-11-10 VITALS — BODY MASS INDEX: 18.29 KG/M2 | HEIGHT: 62 IN

## 2022-11-10 DIAGNOSIS — L60.1 ONYCHOLYSIS OF TOENAIL: Primary | ICD-10-CM

## 2022-11-10 DIAGNOSIS — Z99.3 DEPENDENCE ON WHEELCHAIR: ICD-10-CM

## 2022-11-10 DIAGNOSIS — G20.A1 PD (PARKINSON'S DISEASE): ICD-10-CM

## 2022-11-10 DIAGNOSIS — B35.1 ONYCHOMYCOSIS OF TOENAIL: ICD-10-CM

## 2022-11-10 PROCEDURE — 99213 PR OFFICE/OUTPT VISIT, EST, LEVL III, 20-29 MIN: ICD-10-PCS | Mod: GV,S$GLB,, | Performed by: PODIATRIST

## 2022-11-10 PROCEDURE — 99999 PR PBB SHADOW E&M-EST. PATIENT-LVL I: ICD-10-PCS | Mod: GV,PBBFAC,HPC, | Performed by: PODIATRIST

## 2022-11-10 PROCEDURE — 99213 OFFICE O/P EST LOW 20 MIN: CPT | Mod: GV,S$GLB,, | Performed by: PODIATRIST

## 2022-11-10 PROCEDURE — 99999 PR PBB SHADOW E&M-EST. PATIENT-LVL I: CPT | Mod: GV,PBBFAC,HPC, | Performed by: PODIATRIST

## 2022-11-10 RX ORDER — QUETIAPINE FUMARATE 25 MG/1
25 TABLET, FILM COATED ORAL NIGHTLY
COMMUNITY
Start: 2022-10-28

## 2022-11-10 RX ORDER — DOXYCYCLINE 100 MG/1
100 CAPSULE ORAL 2 TIMES DAILY
COMMUNITY
Start: 2022-09-28

## 2022-11-10 RX ORDER — CIPROFLOXACIN 500 MG/1
500 TABLET ORAL 2 TIMES DAILY
COMMUNITY
Start: 2022-09-01

## 2022-11-10 RX ORDER — ALBUTEROL SULFATE 0.83 MG/ML
2.5 SOLUTION RESPIRATORY (INHALATION) EVERY 4 HOURS
COMMUNITY
Start: 2022-09-28

## 2022-11-13 NOTE — PROGRESS NOTES
Subjective:       Patient ID: Chiara Nunez is a 88 y.o. female.    Chief Complaint: Follow-up and Nail Problem  Patient with dementia, Parkinson's, presents with her daughter for evaluation feet, follow up problem with nails.  Patient is more verbal today. Relates patient has a sacral wound, lives with her daughter, is under care of [facility].        Past Medical History:   Diagnosis Date    Dementia     Parkinsons     PD (Parkinson's disease) 8/9/2019 2011- tremor in mouth; FOG    Urinary tract infection      Past Surgical History:   Procedure Laterality Date    JOINT REPLACEMENT      TOTAL KNEE ARTHROPLASTY       Family History   Problem Relation Age of Onset    Cancer Maternal Grandmother      Social History     Socioeconomic History    Marital status:    Tobacco Use    Smoking status: Never    Smokeless tobacco: Never   Substance and Sexual Activity    Alcohol use: Yes     Alcohol/week: 1.0 standard drink     Types: 1 Glasses of wine per week    Drug use: No       Current Outpatient Medications   Medication Sig Dispense Refill    albuterol (PROVENTIL) 2.5 mg /3 mL (0.083 %) nebulizer solution Take 2.5 mg by nebulization every 4 (four) hours.      busPIRone (BUSPAR) 10 MG tablet Take 10 mg by mouth 3 (three) times daily.      carbidopa-levodopa  mg (SINEMET CR)  mg TbSR Take 1 tablet by mouth 3 (three) times daily. 270 tablet 1    ciprofloxacin HCl (CIPRO) 500 MG tablet Take 500 mg by mouth 2 (two) times daily.      docusate sodium (COLACE) 100 MG capsule Take 100 mg by mouth once daily.      doxycycline (VIBRAMYCIN) 100 MG Cap Take 100 mg by mouth 2 (two) times daily.      entacapone (COMTAN) 200 mg Tab Take 100 mg by mouth 2 (two) times daily.      memantine (NAMENDA) 10 MG Tab Take 10 mg by mouth 2 (two) times daily.      QUEtiapine (SEROQUEL) 25 MG Tab Take 25 mg by mouth every evening.      STOOL SOFTENER-LAXATIVE 8.6-50 mg per tablet Take 1 tablet by mouth daily as  "needed.      traZODone (DESYREL) 100 MG tablet Take 100 mg by mouth every evening.        No current facility-administered medications for this visit.     Review of patient's allergies indicates:   Allergen Reactions    Iron        Review of Systems   Constitutional:         Thin, non verbal, eyes closed, responds to touching of feet   HENT:  Negative for congestion.    Respiratory:  Negative for cough and shortness of breath.    Cardiovascular:  Negative for leg swelling.   Musculoskeletal:  Positive for gait problem.        Wheelchair   All other systems reviewed and are negative.    Objective:      Vitals:    11/10/22 1437   Height: 5' 2" (1.575 m)     Physical Exam  Vitals and nursing note reviewed. Exam conducted with a chaperone present.   Constitutional:       General: She is not in acute distress.     Appearance: She is ill-appearing.   Cardiovascular:      Pulses:           Dorsalis pedis pulses are 1+ on the right side and 1+ on the left side.        Posterior tibial pulses are 1+ on the right side and 1+ on the left side.   Pulmonary:      Effort: Pulmonary effort is normal.   Musculoskeletal:      Right foot: No deformity.      Left foot: No deformity.   Feet:      Right foot:      Skin integrity: Dry skin (mildly dry skin) present.      Toenail Condition: Right toenails are long. Fungal disease present.     Left foot:      Skin integrity: Dry skin (Various degrees of fungal involvement mainly hallux with some onycholysis, distal thickening, no erythema) present.      Toenail Condition: Left toenails are long. Fungal disease present.  Skin:     Capillary Refill: Capillary refill takes 2 to 3 seconds.   Neurological:      General: No focal deficit present.       Assessment:       1. Onycholysis of toenail    2. Onychomycosis of toenail    3. Dependence on wheelchair    4. PD (Parkinson's disease)          Plan:           Reviewed care and maintenance of loose nails, elevated due to fungal involvement, " left hallux is severe and at risk for subungual abscess  Reviewed with daughter filing of nails to prevent thickness, pain, infection  Reviewed topicals which can help with hydration of the nail to prevent pain  Discussed soaking  Reviewed care and maintenance of skin, daily foot checks of feet, legs, skin.  Monitor for any areas of redness, warmth  Nails debrided in thickness reduced.  High risk for complications due to fragile condition of patient and skin  Patient/family were in understanding and agreement with treatment plan.  I counseled the patient on their conditions, implications and medical management.  Instructed patient/family to contact the office with any changes, questions, concerns, worsening of symptoms.   Total face to face time, exam, assessment, treatment, discussion, documentation 20 minutes, more than half this time spent on consultation and coordination of care.   Follow up as needed    This note was created using M*Modal voice recognition software that occasionally misinterpreted phrases or words.

## 2023-02-22 ENCOUNTER — OFFICE VISIT (OUTPATIENT)
Dept: PODIATRY | Facility: CLINIC | Age: 88
End: 2023-02-22
Payer: MEDICARE

## 2023-02-22 VITALS
BODY MASS INDEX: 18.41 KG/M2 | SYSTOLIC BLOOD PRESSURE: 134 MMHG | HEART RATE: 82 BPM | DIASTOLIC BLOOD PRESSURE: 77 MMHG | HEIGHT: 62 IN | WEIGHT: 100.06 LBS

## 2023-02-22 DIAGNOSIS — I73.9 PERIPHERAL VASCULAR DISEASE: ICD-10-CM

## 2023-02-22 DIAGNOSIS — L60.0 INGROWN NAIL: Primary | ICD-10-CM

## 2023-02-22 DIAGNOSIS — G20.A1 PD (PARKINSON'S DISEASE): ICD-10-CM

## 2023-02-22 DIAGNOSIS — R41.9 COGNITIVE COMPLAINTS: ICD-10-CM

## 2023-02-22 PROCEDURE — 1160F PR REVIEW ALL MEDS BY PRESCRIBER/CLIN PHARMACIST DOCUMENTED: ICD-10-PCS | Mod: CPTII,S$GLB,, | Performed by: PODIATRIST

## 2023-02-22 PROCEDURE — 99999 PR PBB SHADOW E&M-EST. PATIENT-LVL IV: CPT | Mod: PBBFAC,,, | Performed by: PODIATRIST

## 2023-02-22 PROCEDURE — 1159F MED LIST DOCD IN RCRD: CPT | Mod: CPTII,S$GLB,, | Performed by: PODIATRIST

## 2023-02-22 PROCEDURE — 1159F PR MEDICATION LIST DOCUMENTED IN MEDICAL RECORD: ICD-10-PCS | Mod: CPTII,S$GLB,, | Performed by: PODIATRIST

## 2023-02-22 PROCEDURE — 3288F PR FALLS RISK ASSESSMENT DOCUMENTED: ICD-10-PCS | Mod: CPTII,S$GLB,, | Performed by: PODIATRIST

## 2023-02-22 PROCEDURE — 3288F FALL RISK ASSESSMENT DOCD: CPT | Mod: CPTII,S$GLB,, | Performed by: PODIATRIST

## 2023-02-22 PROCEDURE — 1101F PT FALLS ASSESS-DOCD LE1/YR: CPT | Mod: CPTII,S$GLB,, | Performed by: PODIATRIST

## 2023-02-22 PROCEDURE — 1125F PR PAIN SEVERITY QUANTIFIED, PAIN PRESENT: ICD-10-PCS | Mod: CPTII,S$GLB,, | Performed by: PODIATRIST

## 2023-02-22 PROCEDURE — 99213 OFFICE O/P EST LOW 20 MIN: CPT | Mod: GV,S$GLB,, | Performed by: PODIATRIST

## 2023-02-22 PROCEDURE — 99213 PR OFFICE/OUTPT VISIT, EST, LEVL III, 20-29 MIN: ICD-10-PCS | Mod: GV,S$GLB,, | Performed by: PODIATRIST

## 2023-02-22 PROCEDURE — 1101F PR PT FALLS ASSESS DOC 0-1 FALLS W/OUT INJ PAST YR: ICD-10-PCS | Mod: CPTII,S$GLB,, | Performed by: PODIATRIST

## 2023-02-22 PROCEDURE — 99999 PR PBB SHADOW E&M-EST. PATIENT-LVL IV: ICD-10-PCS | Mod: PBBFAC,,, | Performed by: PODIATRIST

## 2023-02-22 PROCEDURE — 1125F AMNT PAIN NOTED PAIN PRSNT: CPT | Mod: CPTII,S$GLB,, | Performed by: PODIATRIST

## 2023-02-22 PROCEDURE — 1160F RVW MEDS BY RX/DR IN RCRD: CPT | Mod: CPTII,S$GLB,, | Performed by: PODIATRIST

## 2023-02-22 NOTE — PROGRESS NOTES
Subjective:       Patient ID: Chiara Nunez is a 88 y.o. female.    Chief Complaint: Nail Problem (Thick nails bilaterally)    Patient with dementia, Parkinson's, presents with her daughter for evaluation feet, follow up problem with nails.  Relates patient has a sacral wound, lives with her daughter, is under care of [facility].        Past Medical History:   Diagnosis Date    Dementia     Parkinsons     PD (Parkinson's disease) 8/9/2019 2011- tremor in mouth; FOG    Urinary tract infection      Past Surgical History:   Procedure Laterality Date    JOINT REPLACEMENT      TOTAL KNEE ARTHROPLASTY       Family History   Problem Relation Age of Onset    Cancer Maternal Grandmother      Social History     Socioeconomic History    Marital status:    Tobacco Use    Smoking status: Never    Smokeless tobacco: Never   Substance and Sexual Activity    Alcohol use: Yes     Alcohol/week: 1.0 standard drink     Types: 1 Glasses of wine per week    Drug use: No       Current Outpatient Medications   Medication Sig Dispense Refill    albuterol (PROVENTIL) 2.5 mg /3 mL (0.083 %) nebulizer solution Take 2.5 mg by nebulization every 4 (four) hours.      busPIRone (BUSPAR) 10 MG tablet Take 10 mg by mouth 3 (three) times daily.      carbidopa-levodopa  mg (SINEMET CR)  mg TbSR Take 1 tablet by mouth 3 (three) times daily. 270 tablet 1    docusate sodium (COLACE) 100 MG capsule Take 100 mg by mouth once daily.      entacapone (COMTAN) 200 mg Tab Take 100 mg by mouth 2 (two) times daily.      memantine (NAMENDA) 10 MG Tab Take 10 mg by mouth 2 (two) times daily.      QUEtiapine (SEROQUEL) 25 MG Tab Take 25 mg by mouth every evening.      STOOL SOFTENER-LAXATIVE 8.6-50 mg per tablet Take 1 tablet by mouth daily as needed.      traZODone (DESYREL) 100 MG tablet Take 100 mg by mouth every evening.       ciprofloxacin HCl (CIPRO) 500 MG tablet Take 500 mg by mouth 2 (two) times daily.      doxycycline  "(VIBRAMYCIN) 100 MG Cap Take 100 mg by mouth 2 (two) times daily.       No current facility-administered medications for this visit.     Review of patient's allergies indicates:   Allergen Reactions    Iron        Review of Systems   Constitutional:         Thin, non verbal, eyes closed, responds to touching of feet   HENT:  Negative for congestion.    Respiratory:  Negative for cough and shortness of breath.    Cardiovascular:  Negative for leg swelling.   Musculoskeletal:  Positive for gait problem.        Wheelchair   All other systems reviewed and are negative.    Objective:      Vitals:    02/22/23 1204   BP: 134/77   Pulse: 82   Weight: 45.4 kg (100 lb 1.4 oz)   Height: 5' 2" (1.575 m)     Physical Exam  Vitals and nursing note reviewed. Exam conducted with a chaperone present.   Constitutional:       General: She is not in acute distress.     Appearance: She is ill-appearing.   Cardiovascular:      Pulses:           Dorsalis pedis pulses are 1+ on the right side and 1+ on the left side.        Posterior tibial pulses are 0 on the right side and 0 on the left side.   Pulmonary:      Effort: Pulmonary effort is normal.   Musculoskeletal:         General: Tenderness and deformity present.      Right foot: No deformity.      Left foot: No deformity.   Feet:      Right foot:      Skin integrity: Dry skin (mildly dry skin) present.      Toenail Condition: Right toenails are long and ingrown. Fungal disease present.     Left foot:      Skin integrity: Dry skin (Various degrees of fungal involvement mainly hallux with some onycholysis, distal thickening, no erythema) present.      Toenail Condition: Left toenails are long and ingrown. Fungal disease present.  Skin:     Capillary Refill: Capillary refill takes 2 to 3 seconds.      Findings: Erythema present.   Neurological:      General: No focal deficit present.                         Assessment:       1. Ingrown nail    2. Peripheral vascular disease    3. PD " (Parkinson's disease)    4. Cognitive complaints          Plan:           Reviewed care and maintenance of loose nails, elevated due to fungal involvement, left hallux is severe and at risk for subungual abscess  Reviewed with daughter filing of nails to prevent thickness, pain, infection  Reviewed topicals which can help with hydration of the nail to prevent pain  Discussed soaking  Reviewed care and maintenance of skin, daily foot checks of feet, legs, skin.  Monitor for any areas of redness, warmth  Nails debrided in thickness reduced.  High risk for complications due to fragile condition of patient and skin  Patient/family were in understanding and agreement with treatment plan.  I counseled the patient on their conditions, implications and medical management.  Instructed patient/family to contact the office with any changes, questions, concerns, worsening of symptoms.   Total face to face time, exam, assessment, treatment, discussion, documentation 20 minutes, more than half this time spent on consultation and coordination of care. Patient did have ingrowing toenails medial lateral border bilateral hallux the most significant was the lateral border right hallux where there was some redness and swelling the patient did have some discomfort upon removal of the ingrowing nail however this did not require a nail avulsion.  Patient recently caught the toenail 3rd digit left on her sock and it partially lifted the distal aspect of the nail up this was trimmed I did recommend to the patient's daughter the application of Vicks vapor rub twice a day every day to combat the fungal infection.  Follow up as needed    This note was created using M*Modal voice recognition software that occasionally misinterpreted phrases or words.